# Patient Record
Sex: MALE | Race: WHITE | Employment: FULL TIME | URBAN - METROPOLITAN AREA
[De-identification: names, ages, dates, MRNs, and addresses within clinical notes are randomized per-mention and may not be internally consistent; named-entity substitution may affect disease eponyms.]

---

## 2017-07-27 ENCOUNTER — HOSPITAL ENCOUNTER (OUTPATIENT)
Age: 22
Discharge: HOME | End: 2017-07-27
Payer: COMMERCIAL

## 2017-07-27 VITALS
OXYGEN SATURATION: 100 % | HEART RATE: 94 BPM | HEIGHT: 68 IN | WEIGHT: 230 LBS | BODY MASS INDEX: 34.86 KG/M2 | DIASTOLIC BLOOD PRESSURE: 73 MMHG | SYSTOLIC BLOOD PRESSURE: 145 MMHG | RESPIRATION RATE: 20 BRPM | TEMPERATURE: 99 F

## 2017-07-27 DIAGNOSIS — Z77.098 CHEMICAL EXPOSURE: Primary | ICD-10-CM

## 2017-07-27 PROCEDURE — 99214 OFFICE O/P EST MOD 30 MIN: CPT

## 2017-07-27 PROCEDURE — 99213 OFFICE O/P EST LOW 20 MIN: CPT

## 2017-07-27 NOTE — ED PROVIDER NOTES
Patient presents with:  Rash Skin Problem (integumentary)      HPI:     Vera Samayoa is a 24year old male who presents today with a chief complaint of a circular red rash to the anterior aspect of the left forearm that he noticed approximately 4-5 days adenopathy  LUNGS: clear to auscultation, no RRW  CARDIO: RRR without murmur  EXTREMITIES: no cyanosis or edema. CRAIN without difficulty. No bony tenderness. Good ROM of the left arm.     MDM/Assessment/Plan:   Orders for this encounter:      Orders Placed T

## 2017-07-27 NOTE — ED INITIAL ASSESSMENT (HPI)
REPORTS \"RASH\"  TO LEFT ARM STARTING LAST Saturday, STATES SWELLING ACCOMPANIED RASH. DENIES USE OF ANY NEW SOAPS/DETERGENTS/LOTIONS/MEDICATIONS/FOODS.

## 2020-06-20 ENCOUNTER — APPOINTMENT (OUTPATIENT)
Dept: GENERAL RADIOLOGY | Age: 25
End: 2020-06-20
Attending: EMERGENCY MEDICINE
Payer: COMMERCIAL

## 2020-06-20 ENCOUNTER — HOSPITAL ENCOUNTER (OUTPATIENT)
Age: 25
Discharge: HOME | End: 2020-06-20
Attending: EMERGENCY MEDICINE
Payer: COMMERCIAL

## 2020-06-20 VITALS
OXYGEN SATURATION: 97 % | DIASTOLIC BLOOD PRESSURE: 74 MMHG | SYSTOLIC BLOOD PRESSURE: 149 MMHG | TEMPERATURE: 98 F | RESPIRATION RATE: 18 BRPM | HEART RATE: 98 BPM

## 2020-06-20 DIAGNOSIS — S60.011A CONTUSION OF RIGHT THUMB WITHOUT DAMAGE TO NAIL, INITIAL ENCOUNTER: Primary | ICD-10-CM

## 2020-06-20 PROCEDURE — 99213 OFFICE O/P EST LOW 20 MIN: CPT

## 2020-06-20 PROCEDURE — 73140 X-RAY EXAM OF FINGER(S): CPT | Performed by: EMERGENCY MEDICINE

## 2020-06-20 PROCEDURE — 29130 APPL FINGER SPLINT STATIC: CPT

## 2020-06-20 NOTE — ED PROVIDER NOTES
Patient Seen in: [facility]      History   Patient presents with:  Contusion    Stated Complaint: Finger injury     HPI    Crushed his right thumb with a brick. Presents with pain and swellling to the distal thumb.  Pain w normal.              ED Course   Labs Reviewed - No data to display               MDM           Xray negative.          Disposition and Plan     Clinical Impression:  Contusion of right thumb without damage to nail, initial encounter  (primary encounter olga

## 2020-06-20 NOTE — ED INITIAL ASSESSMENT (HPI)
PATIENT WITH RIGHT THUMB CRUSH INJURY. STATES THIS AM HE WAS WORKING ON MAKING A RETAINING WALL WHEN A BRICK FELL ONTO HIS RIGHT THUMB.   PATIENT NOTED SOME BLEEDING INITALLY FROM THE NAIL BED.  + PAIN INCREASES WITH MOVEMENT

## 2021-10-16 ENCOUNTER — HOSPITAL ENCOUNTER (OUTPATIENT)
Age: 26
Discharge: HOME | End: 2021-10-16
Payer: COMMERCIAL

## 2021-10-16 VITALS
OXYGEN SATURATION: 100 % | TEMPERATURE: 99 F | HEART RATE: 99 BPM | DIASTOLIC BLOOD PRESSURE: 80 MMHG | SYSTOLIC BLOOD PRESSURE: 150 MMHG | RESPIRATION RATE: 17 BRPM

## 2021-10-16 DIAGNOSIS — J02.0 STREPTOCOCCAL SORE THROAT: Primary | ICD-10-CM

## 2021-10-16 PROCEDURE — 87880 STREP A ASSAY W/OPTIC: CPT

## 2021-10-16 PROCEDURE — 99213 OFFICE O/P EST LOW 20 MIN: CPT

## 2021-10-16 RX ORDER — AMOXICILLIN 875 MG/1
875 TABLET, COATED ORAL 2 TIMES DAILY
Qty: 20 TABLET | Refills: 0 | Status: SHIPPED | OUTPATIENT
Start: 2021-10-16 | End: 2021-10-26

## 2021-10-16 NOTE — ED PROVIDER NOTES
Patient presents with:  Sore Throat      HPI:     Mackenzie Soto is a 22year old male who presents for sore throat and lymph node swelling to the neck that started a few days ago. He states the sore throat has been intermittent for the past 4 to 5 days. Family: Not on file      Frequency of Social Gatherings with Friends and Family: Not on file      Attends [employer] Services: Not on file      Active Member of Clubs or Organizations: Not on file      Attends Club or Organization Meetings: Not on file      past 10 hour(s))   Rapid SARS-CoV-2 by PCR    Collection Time: 10/16/21  3:01 PM    Specimen: Nares;  Other   Result Value Ref Range    Rapid SARS-CoV-2 by PCR Not Detected Not Detected   POCT Rapid Strep    Collection Time: 10/16/21  3:10 PM   Result Value